# Patient Record
Sex: MALE | Race: WHITE | Employment: FULL TIME | URBAN - METROPOLITAN AREA
[De-identification: names, ages, dates, MRNs, and addresses within clinical notes are randomized per-mention and may not be internally consistent; named-entity substitution may affect disease eponyms.]

---

## 2019-08-28 ENCOUNTER — HOSPITAL ENCOUNTER (EMERGENCY)
Age: 29
Discharge: HOME | End: 2019-08-28
Payer: COMMERCIAL

## 2019-08-28 VITALS
SYSTOLIC BLOOD PRESSURE: 158 MMHG | RESPIRATION RATE: 16 BRPM | HEART RATE: 80 BPM | OXYGEN SATURATION: 99 % | HEIGHT: 68 IN | DIASTOLIC BLOOD PRESSURE: 104 MMHG | TEMPERATURE: 98.3 F | WEIGHT: 200 LBS | BODY MASS INDEX: 30.31 KG/M2

## 2019-08-28 DIAGNOSIS — M25.511 ACUTE PAIN OF RIGHT SHOULDER: Primary | ICD-10-CM

## 2019-08-28 PROCEDURE — 99282 EMERGENCY DEPT VISIT SF MDM: CPT

## 2019-08-28 RX ORDER — CYCLOBENZAPRINE HCL 10 MG
10 TABLET ORAL 3 TIMES DAILY PRN
Qty: 15 TABLET | Refills: 0 | Status: SHIPPED | OUTPATIENT
Start: 2019-08-28 | End: 2019-09-07

## 2019-08-28 RX ORDER — NAPROXEN 500 MG/1
500 TABLET ORAL 2 TIMES DAILY
Qty: 20 TABLET | Refills: 0 | Status: SHIPPED | OUTPATIENT
Start: 2019-08-28 | End: 2019-09-07

## 2019-08-28 SDOH — HEALTH STABILITY: MENTAL HEALTH: HOW OFTEN DO YOU HAVE A DRINK CONTAINING ALCOHOL?: NEVER

## 2019-08-28 ASSESSMENT — PAIN DESCRIPTION - ORIENTATION: ORIENTATION: RIGHT

## 2019-08-28 ASSESSMENT — ENCOUNTER SYMPTOMS
ABDOMINAL PAIN: 0
BACK PAIN: 0
COUGH: 0
SHORTNESS OF BREATH: 0

## 2019-08-28 ASSESSMENT — PAIN DESCRIPTION - LOCATION: LOCATION: SHOULDER

## 2019-08-28 ASSESSMENT — PAIN DESCRIPTION - DESCRIPTORS: DESCRIPTORS: ACHING

## 2019-08-28 ASSESSMENT — PAIN DESCRIPTION - PAIN TYPE: TYPE: ACUTE PAIN

## 2019-08-28 NOTE — ED PROVIDER NOTES
use: None    Sexual activity: None   Lifestyle    Physical activity:     Days per week: None     Minutes per session: None    Stress: None   Relationships    Social connections:     Talks on phone: None     Gets together: None     Attends Catholic service: None     Active member of club or organization: None     Attends meetings of clubs or organizations: None     Relationship status: None    Intimate partner violence:     Fear of current or ex partner: None     Emotionally abused: None     Physically abused: None     Forced sexual activity: None   Other Topics Concern    None   Social History Narrative    None       SCREENINGS      @FLOW([id number])@      PHYSICAL EXAM    (up to 7 for level 4, 8 or more for level 5)     ED Triage Vitals   BP Temp Temp Source Pulse Resp SpO2 Height Weight   08/28/19 1159 08/28/19 1157 08/28/19 1157 08/28/19 1157 08/28/19 1157 08/28/19 1157 08/28/19 1157 08/28/19 1157   (!) 158/104 98.3 °F (36.8 °C) Oral 80 16 99 % 5' 8\" (1.727 m) 200 lb (90.7 kg)       Physical Exam   Constitutional: He is oriented to person, place, and time. He appears well-developed and well-nourished. HENT:   Head: Normocephalic and atraumatic. Right Ear: External ear normal.   Left Ear: External ear normal.   Mouth/Throat: Oropharynx is clear and moist.   Eyes: Pupils are equal, round, and reactive to light. Conjunctivae and EOM are normal.   Neck: Normal range of motion. Neck supple. Cardiovascular: Normal rate and regular rhythm. Pulmonary/Chest: Effort normal and breath sounds normal.   Abdominal: Soft. Bowel sounds are normal. He exhibits no distension. There is no tenderness. Musculoskeletal: Normal range of motion. Right shoulder: He exhibits tenderness and pain. He exhibits normal range of motion, no bony tenderness, no swelling, no effusion, no deformity, no laceration, no spasm and normal strength. Arms:  Neurological: He is alert and oriented to person, place, and time.  He

## 2020-07-05 ENCOUNTER — HOSPITAL ENCOUNTER (EMERGENCY)
Age: 30
Discharge: HOME | End: 2020-07-05
Attending: EMERGENCY MEDICINE

## 2020-07-05 ENCOUNTER — APPOINTMENT (OUTPATIENT)
Dept: CT IMAGING | Age: 30
End: 2020-07-05

## 2020-07-05 VITALS
BODY MASS INDEX: 28 KG/M2 | HEIGHT: 71 IN | HEART RATE: 80 BPM | SYSTOLIC BLOOD PRESSURE: 146 MMHG | WEIGHT: 200 LBS | TEMPERATURE: 98.2 F | DIASTOLIC BLOOD PRESSURE: 100 MMHG | OXYGEN SATURATION: 100 % | RESPIRATION RATE: 20 BRPM

## 2020-07-05 LAB
ALBUMIN SERPL-MCNC: 4.6 G/DL (ref 3.5–4.6)
ALP BLD-CCNC: 97 U/L (ref 35–104)
ALT SERPL-CCNC: 64 U/L (ref 0–41)
ANION GAP SERPL CALCULATED.3IONS-SCNC: 16 MEQ/L (ref 9–15)
AST SERPL-CCNC: 59 U/L (ref 0–40)
BASOPHILS ABSOLUTE: 0.1 K/UL (ref 0–0.2)
BASOPHILS RELATIVE PERCENT: 0.7 %
BILIRUB SERPL-MCNC: <0.2 MG/DL (ref 0.2–0.7)
BUN BLDV-MCNC: 13 MG/DL (ref 6–20)
CALCIUM SERPL-MCNC: 9.2 MG/DL (ref 8.5–9.9)
CHLORIDE BLD-SCNC: 99 MEQ/L (ref 95–107)
CO2: 25 MEQ/L (ref 20–31)
CREAT SERPL-MCNC: 0.91 MG/DL (ref 0.7–1.2)
EOSINOPHILS ABSOLUTE: 0.2 K/UL (ref 0–0.7)
EOSINOPHILS RELATIVE PERCENT: 1.6 %
GFR AFRICAN AMERICAN: >60
GFR AFRICAN AMERICAN: >60
GFR NON-AFRICAN AMERICAN: >60
GFR NON-AFRICAN AMERICAN: >60
GLOBULIN: 2.6 G/DL (ref 2.3–3.5)
GLUCOSE BLD-MCNC: 115 MG/DL (ref 70–99)
HCT VFR BLD CALC: 46.1 % (ref 42–52)
HEMOGLOBIN: 16 G/DL (ref 14–18)
LYMPHOCYTES ABSOLUTE: 1.6 K/UL (ref 1–4.8)
LYMPHOCYTES RELATIVE PERCENT: 14.1 %
MCH RBC QN AUTO: 32.1 PG (ref 27–31.3)
MCHC RBC AUTO-ENTMCNC: 34.7 % (ref 33–37)
MCV RBC AUTO: 92.6 FL (ref 80–100)
MONOCYTES ABSOLUTE: 0.8 K/UL (ref 0.2–0.8)
MONOCYTES RELATIVE PERCENT: 7.2 %
NEUTROPHILS ABSOLUTE: 8.8 K/UL (ref 1.4–6.5)
NEUTROPHILS RELATIVE PERCENT: 76.4 %
PDW BLD-RTO: 12.1 % (ref 11.5–14.5)
PERFORMED ON: NORMAL
PLATELET # BLD: 200 K/UL (ref 130–400)
POC CREATININE: 1.3 MG/DL (ref 0.9–1.3)
POC SAMPLE TYPE: NORMAL
POTASSIUM SERPL-SCNC: 3.8 MEQ/L (ref 3.4–4.9)
RBC # BLD: 4.98 M/UL (ref 4.7–6.1)
SODIUM BLD-SCNC: 140 MEQ/L (ref 135–144)
TOTAL PROTEIN: 7.2 G/DL (ref 6.3–8)
WBC # BLD: 11.5 K/UL (ref 4.8–10.8)

## 2020-07-05 PROCEDURE — 80053 COMPREHEN METABOLIC PANEL: CPT

## 2020-07-05 PROCEDURE — 6360000002 HC RX W HCPCS: Performed by: EMERGENCY MEDICINE

## 2020-07-05 PROCEDURE — 70450 CT HEAD/BRAIN W/O DYE: CPT

## 2020-07-05 PROCEDURE — 74177 CT ABD & PELVIS W/CONTRAST: CPT

## 2020-07-05 PROCEDURE — 99285 EMERGENCY DEPT VISIT HI MDM: CPT

## 2020-07-05 PROCEDURE — 72125 CT NECK SPINE W/O DYE: CPT

## 2020-07-05 PROCEDURE — 36415 COLL VENOUS BLD VENIPUNCTURE: CPT

## 2020-07-05 PROCEDURE — 85025 COMPLETE CBC W/AUTO DIFF WBC: CPT

## 2020-07-05 PROCEDURE — 2580000003 HC RX 258: Performed by: EMERGENCY MEDICINE

## 2020-07-05 PROCEDURE — 96375 TX/PRO/DX INJ NEW DRUG ADDON: CPT

## 2020-07-05 PROCEDURE — 96365 THER/PROPH/DIAG IV INF INIT: CPT

## 2020-07-05 PROCEDURE — 6360000004 HC RX CONTRAST MEDICATION: Performed by: EMERGENCY MEDICINE

## 2020-07-05 PROCEDURE — 71260 CT THORAX DX C+: CPT

## 2020-07-05 PROCEDURE — 96376 TX/PRO/DX INJ SAME DRUG ADON: CPT

## 2020-07-05 RX ORDER — KETOROLAC TROMETHAMINE 30 MG/ML
30 INJECTION, SOLUTION INTRAMUSCULAR; INTRAVENOUS ONCE
Status: COMPLETED | OUTPATIENT
Start: 2020-07-05 | End: 2020-07-05

## 2020-07-05 RX ORDER — ONDANSETRON 2 MG/ML
4 INJECTION INTRAMUSCULAR; INTRAVENOUS ONCE
Status: COMPLETED | OUTPATIENT
Start: 2020-07-05 | End: 2020-07-05

## 2020-07-05 RX ORDER — 0.9 % SODIUM CHLORIDE 0.9 %
1000 INTRAVENOUS SOLUTION INTRAVENOUS ONCE
Status: COMPLETED | OUTPATIENT
Start: 2020-07-05 | End: 2020-07-05

## 2020-07-05 RX ORDER — TRAMADOL HYDROCHLORIDE 50 MG/1
50 TABLET ORAL EVERY 4 HOURS PRN
Qty: 18 TABLET | Refills: 0 | Status: SHIPPED | OUTPATIENT
Start: 2020-07-05 | End: 2020-07-08

## 2020-07-05 RX ORDER — MORPHINE SULFATE 2 MG/ML
4 INJECTION, SOLUTION INTRAMUSCULAR; INTRAVENOUS
Status: COMPLETED | OUTPATIENT
Start: 2020-07-05 | End: 2020-07-05

## 2020-07-05 RX ADMIN — HYDROMORPHONE HYDROCHLORIDE 1 MG: 1 INJECTION, SOLUTION INTRAMUSCULAR; INTRAVENOUS; SUBCUTANEOUS at 05:08

## 2020-07-05 RX ADMIN — KETOROLAC TROMETHAMINE 30 MG: 30 INJECTION, SOLUTION INTRAMUSCULAR at 03:14

## 2020-07-05 RX ADMIN — MORPHINE SULFATE 4 MG: 2 INJECTION, SOLUTION INTRAMUSCULAR; INTRAVENOUS at 03:14

## 2020-07-05 RX ADMIN — IOPAMIDOL 100 ML: 612 INJECTION, SOLUTION INTRAVENOUS at 04:12

## 2020-07-05 RX ADMIN — SODIUM CHLORIDE 1000 ML: 9 INJECTION, SOLUTION INTRAVENOUS at 03:14

## 2020-07-05 RX ADMIN — MORPHINE SULFATE 4 MG: 2 INJECTION, SOLUTION INTRAMUSCULAR; INTRAVENOUS at 03:34

## 2020-07-05 RX ADMIN — ONDANSETRON 4 MG: 2 INJECTION INTRAMUSCULAR; INTRAVENOUS at 05:08

## 2020-07-05 RX ADMIN — ONDANSETRON 4 MG: 2 INJECTION INTRAMUSCULAR; INTRAVENOUS at 03:14

## 2020-07-05 ASSESSMENT — ENCOUNTER SYMPTOMS
COUGH: 0
VOMITING: 0
SORE THROAT: 0
DIARRHEA: 0
BACK PAIN: 0
NAUSEA: 0
ABDOMINAL PAIN: 1
SHORTNESS OF BREATH: 0

## 2020-07-05 ASSESSMENT — PAIN DESCRIPTION - DESCRIPTORS: DESCRIPTORS: ACHING

## 2020-07-05 ASSESSMENT — PAIN SCALES - GENERAL
PAINLEVEL_OUTOF10: 10

## 2020-07-05 ASSESSMENT — PAIN DESCRIPTION - ORIENTATION: ORIENTATION: RIGHT

## 2020-07-05 ASSESSMENT — PAIN DESCRIPTION - LOCATION: LOCATION: RIB CAGE

## 2020-07-05 ASSESSMENT — PAIN DESCRIPTION - PAIN TYPE: TYPE: ACUTE PAIN
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
## 2021-06-06 ENCOUNTER — HOSPITAL ENCOUNTER (EMERGENCY)
Age: 31
Discharge: LEFT BEFORE DISCHARGE | End: 2021-06-06
Payer: COMMERCIAL

## 2021-06-06 ENCOUNTER — HOSPITAL ENCOUNTER (EMERGENCY)
Age: 31
Discharge: HOME | End: 2021-06-06
Attending: EMERGENCY MEDICINE
Payer: COMMERCIAL

## 2021-06-06 VITALS
OXYGEN SATURATION: 99 % | DIASTOLIC BLOOD PRESSURE: 81 MMHG | SYSTOLIC BLOOD PRESSURE: 122 MMHG | WEIGHT: 185 LBS | HEART RATE: 87 BPM | TEMPERATURE: 97.4 F | BODY MASS INDEX: 25.9 KG/M2 | HEIGHT: 71 IN | RESPIRATION RATE: 17 BRPM

## 2021-06-06 VITALS
WEIGHT: 180 LBS | OXYGEN SATURATION: 97 % | SYSTOLIC BLOOD PRESSURE: 129 MMHG | BODY MASS INDEX: 25.2 KG/M2 | DIASTOLIC BLOOD PRESSURE: 83 MMHG | HEART RATE: 92 BPM | TEMPERATURE: 97.8 F | HEIGHT: 71 IN | RESPIRATION RATE: 18 BRPM

## 2021-06-06 DIAGNOSIS — S01.01XA LACERATION OF SCALP, INITIAL ENCOUNTER: Primary | ICD-10-CM

## 2021-06-06 PROCEDURE — 99283 EMERGENCY DEPT VISIT LOW MDM: CPT

## 2021-06-06 PROCEDURE — 6370000000 HC RX 637 (ALT 250 FOR IP): Performed by: EMERGENCY MEDICINE

## 2021-06-06 PROCEDURE — 12001 RPR S/N/AX/GEN/TRNK 2.5CM/<: CPT

## 2021-06-06 RX ORDER — DULOXETIN HYDROCHLORIDE 30 MG/1
30 CAPSULE, DELAYED RELEASE ORAL DAILY
COMMUNITY

## 2021-06-06 RX ORDER — LISINOPRIL 10 MG/1
10 TABLET ORAL DAILY
COMMUNITY

## 2021-06-06 RX ORDER — ACETAMINOPHEN 500 MG
1000 TABLET ORAL ONCE
Status: COMPLETED | OUTPATIENT
Start: 2021-06-06 | End: 2021-06-06

## 2021-06-06 RX ADMIN — ACETAMINOPHEN 1000 MG: 500 TABLET ORAL at 22:39

## 2021-06-06 ASSESSMENT — ENCOUNTER SYMPTOMS
EYE DISCHARGE: 0
VOMITING: 0
NAUSEA: 0
COUGH: 0
EYE REDNESS: 0
SHORTNESS OF BREATH: 0
SORE THROAT: 0
BACK PAIN: 0
ABDOMINAL PAIN: 0

## 2021-06-06 ASSESSMENT — PAIN SCALES - GENERAL
PAINLEVEL_OUTOF10: 5
PAINLEVEL_OUTOF10: 3

## 2021-06-06 ASSESSMENT — PAIN DESCRIPTION - DESCRIPTORS
DESCRIPTORS: ACHING
DESCRIPTORS: ACHING;HEADACHE

## 2021-06-06 ASSESSMENT — PAIN DESCRIPTION - PAIN TYPE: TYPE: ACUTE PAIN

## 2021-06-06 ASSESSMENT — PAIN DESCRIPTION - FREQUENCY: FREQUENCY: CONTINUOUS

## 2021-06-06 ASSESSMENT — PAIN DESCRIPTION - LOCATION
LOCATION: HEAD
LOCATION: HEAD

## 2021-06-06 NOTE — LETTER
[facility] ED  [address] [place] [address]  Phone: [phone]               June 6, 2021    Patient: Pippa Curry   YOB: 1990   Date of Visit: 6/6/2021       To Whom It May Concern:    Duey Nissen was seen and treated in our emergency department on 6/6/2021. He may return to work on 6/7/21.       Sincerely,       Jerri Calvo RN         Signature:__________________________________

## 2021-06-07 NOTE — ED PROVIDER NOTES
Surgical History:   Procedure Laterality Date    ROTATOR CUFF REPAIR           CURRENT MEDICATIONS       Discharge Medication List as of 6/6/2021 10:55 PM      CONTINUE these medications which have NOT CHANGED    Details   DULoxetine (CYMBALTA) 30 MG extended release capsule Take 30 mg by mouth dailyHistorical Med      lisinopril (PRINIVIL;ZESTRIL) 10 MG tablet Take 10 mg by mouth dailyHistorical Med      naproxen (NAPROSYN) 500 MG tablet Take 1 tablet by mouth 2 times daily for 20 doses, Disp-20 tablet, R-0Print             ALLERGIES     Patient has no known allergies. FAMILY HISTORY     History reviewed. No pertinent family history. SOCIAL HISTORY       Social History     Socioeconomic History    Marital status:      Spouse name: None    Number of children: None    Years of education: None    Highest education level: None   Occupational History    None   Tobacco Use    Smoking status: Current Some Day Smoker    Smokeless tobacco: Never Used   Vaping Use    Vaping Use: Never used   Substance and Sexual Activity    Alcohol use: Yes    Drug use: Never    Sexual activity: None   Other Topics Concern    None   Social History Narrative    None     Social Determinants of Health     Financial Resource Strain:     Difficulty of Paying Living Expenses:    Food Insecurity:     Worried About Running Out of Food in the Last Year:     [address] in the Last Year:    Transportation Needs:     Lack of Transportation (Medical):      Lack of Transportation (Non-Medical):    Physical Activity:     Days of Exercise per Week:     Minutes of Exercise per Session:    Stress:     Feeling of Stress :    Social Connections:     Frequency of Communication with Friends and Family:     Frequency of Social Gatherings with Friends and Family:     Attends Yarsanism Services:     Active Member of Clubs or Organizations:     Attends Club or Organization Meetings:     Marital Status:    Intimate Partner Violence:     Fear of Current or Ex-Partner:     Emotionally Abused:     Physically Abused:     Sexually Abused:          PHYSICAL EXAM       ED Triage Vitals [06/06/21 2216]   BP Temp Temp Source Pulse Resp SpO2 Height Weight   122/81 97.4 °F (36.3 °C) Oral 87 17 99 % 5' 11\" (1.803 m) 185 lb (83.9 kg)       Physical Exam  Vitals and nursing note reviewed. Constitutional:       Appearance: Normal appearance. HENT:      Head: Normocephalic and atraumatic. Right Ear: Tympanic membrane normal.      Left Ear: Tympanic membrane normal.      Nose: Nose normal.      Mouth/Throat:      Mouth: Mucous membranes are moist.      Pharynx: Oropharynx is clear. Eyes:      General: Lids are normal.      Extraocular Movements: Extraocular movements intact. Conjunctiva/sclera: Conjunctivae normal.      Pupils: Pupils are equal, round, and reactive to light. Cardiovascular:      Rate and Rhythm: Normal rate and regular rhythm. Pulses: Normal pulses. Heart sounds: Normal heart sounds. Pulmonary:      Effort: Pulmonary effort is normal.      Breath sounds: Normal breath sounds. Abdominal:      General: Abdomen is flat. Bowel sounds are normal.      Palpations: Abdomen is soft. Musculoskeletal:         General: Normal range of motion. Cervical back: Full passive range of motion without pain, normal range of motion and neck supple. Skin:     General: Skin is warm. Capillary Refill: Capillary refill takes less than 2 seconds. Findings: Laceration and lesion present. Neurological:      General: No focal deficit present. Mental Status: He is alert and oriented to person, place, and time. Deep Tendon Reflexes: Reflexes are normal and symmetric. Psychiatric:         Attention and Perception: Attention and perception normal.         Mood and Affect: Mood normal.         Behavior: Behavior normal. Behavior is cooperative.            LABS:  Labs Reviewed - No data to display      MDM:   Vitals:    Vitals:    06/06/21 2216   BP: 122/81   Pulse: 87   Resp: 17   Temp: 97.4 °F (36.3 °C)   TempSrc: Oral   SpO2: 99%   Weight: 185 lb (83.9 kg)   Height: 5' 11\" (1.803 m)       MDM  Number of Diagnoses or Management Options  Diagnosis management comments: Patient presents with head laceration. 5 staples placed. The patient will follow up in 9 days for staple removal.  He will take OTC pain medication as needed. Lakisha Rene DO     The lab results, radiology and test results were reviewed with the patient and family. The patient will follow up in 2 days with their primary care doctor. If their symptoms change or get worse they will return to the ER. CRITICAL CARE TIME   Total CriticalCare time was 0 minutes, excluding separately reportable procedures. There was a high probability of clinically significant/life threatening deterioration in the patient's condition which required my urgent intervention. PROCEDURES:  Unlessotherwise noted below, none     Lac Repair    Date/Time: 6/6/2021 10:29 PM  Performed by: Sampson Aleman DO  Authorized by: Sampson Aleman DO     Consent:     Consent obtained:  Verbal    Consent given by:  Patient    Risks discussed:  Infection, pain, poor cosmetic result and poor wound healing    Alternatives discussed:  No treatment, delayed treatment and observation  Anesthesia (see MAR for exact dosages):      Anesthesia method:  None  Laceration details:     Location:  Scalp    Scalp location:  Occipital  Repair type:     Repair type:  Simple  Pre-procedure details:     Preparation:  Patient was prepped and draped in usual sterile fashion  Exploration:     Hemostasis achieved with:  Direct pressure    Wound exploration: wound explored through full range of motion      Contaminated: no    Treatment:     Area cleansed with:  Saline and Shur-Clens    Amount of cleaning:  Standard    Irrigation method:  Pressure wash    Visualized foreign bodies/material removed: no    Skin repair:     Repair method:  Staples  Approximation:     Approximation:  Close  Post-procedure details:     Patient tolerance of procedure: Tolerated well, no immediate complications          FINAL IMPRESSION      1.  Laceration of scalp, initial encounter          DISPOSITION/PLAN   DISPOSITION            Lakisha Rene DO (electronically signed)  Attending Emergency Physician          Sampson Aleman DO  06/09/21 2052

## 2023-03-11 ENCOUNTER — APPOINTMENT (OUTPATIENT)
Dept: CT IMAGING | Age: 33
End: 2023-03-11
Payer: COMMERCIAL

## 2023-03-11 ENCOUNTER — HOSPITAL ENCOUNTER (EMERGENCY)
Age: 33
Discharge: PSYCHIATRIC OR SUBSTANCE USE FACILITY | End: 2023-03-12
Attending: EMERGENCY MEDICINE
Payer: COMMERCIAL

## 2023-03-11 ENCOUNTER — APPOINTMENT (OUTPATIENT)
Dept: GENERAL RADIOLOGY | Age: 33
End: 2023-03-11
Payer: COMMERCIAL

## 2023-03-11 DIAGNOSIS — F10.920 ACUTE ALCOHOLIC INTOXICATION WITHOUT COMPLICATION (HCC): Primary | ICD-10-CM

## 2023-03-11 DIAGNOSIS — F39 MOOD DISORDER (HCC): ICD-10-CM

## 2023-03-11 LAB
ACETAMINOPHEN LEVEL: <5 UG/ML (ref 10–30)
ALBUMIN SERPL-MCNC: 4.9 G/DL (ref 3.5–4.6)
ALP BLD-CCNC: 93 U/L (ref 35–104)
ALT SERPL-CCNC: 34 U/L (ref 0–41)
AMPHETAMINE SCREEN, URINE: NORMAL
ANION GAP SERPL CALCULATED.3IONS-SCNC: 15 MEQ/L (ref 9–15)
APTT: 25.3 SEC (ref 24.4–36.8)
AST SERPL-CCNC: 33 U/L (ref 0–40)
BARBITURATE SCREEN URINE: NORMAL
BASOPHILS ABSOLUTE: 0.1 K/UL (ref 0–0.2)
BASOPHILS RELATIVE PERCENT: 0.6 %
BENZODIAZEPINE SCREEN, URINE: NORMAL
BILIRUB SERPL-MCNC: <0.2 MG/DL (ref 0.2–0.7)
BILIRUBIN URINE: NEGATIVE
BLOOD, URINE: NEGATIVE
BUN BLDV-MCNC: 17 MG/DL (ref 6–20)
CALCIUM SERPL-MCNC: 9.3 MG/DL (ref 8.5–9.9)
CANNABINOID SCREEN URINE: NORMAL
CHLORIDE BLD-SCNC: 103 MEQ/L (ref 95–107)
CHOLESTEROL, TOTAL: 183 MG/DL (ref 0–199)
CLARITY: CLEAR
CO2: 21 MEQ/L (ref 20–31)
COCAINE METABOLITE SCREEN URINE: NORMAL
COLOR: YELLOW
CREAT SERPL-MCNC: 0.98 MG/DL (ref 0.7–1.2)
EOSINOPHILS ABSOLUTE: 0.3 K/UL (ref 0–0.7)
EOSINOPHILS RELATIVE PERCENT: 2.7 %
ETHANOL PERCENT: 0.19 G/DL
ETHANOL: 212 MG/DL (ref 0–0.08)
FENTANYL SCREEN, URINE: NORMAL
GFR SERPL CREATININE-BSD FRML MDRD: >60 ML/MIN/{1.73_M2}
GLOBULIN: 2.5 G/DL (ref 2.3–3.5)
GLUCOSE BLD-MCNC: 122 MG/DL (ref 70–99)
GLUCOSE URINE: NEGATIVE MG/DL
HCT VFR BLD CALC: 47.5 % (ref 42–52)
HDLC SERPL-MCNC: 48 MG/DL (ref 40–59)
HEMOGLOBIN: 16.3 G/DL (ref 14–18)
INR BLD: 0.9
KETONES, URINE: NEGATIVE MG/DL
LDL CHOLESTEROL CALCULATED: ABNORMAL MG/DL (ref 0–129)
LEUKOCYTE ESTERASE, URINE: NEGATIVE
LYMPHOCYTES ABSOLUTE: 2.1 K/UL (ref 1–4.8)
LYMPHOCYTES RELATIVE PERCENT: 20.4 %
Lab: NORMAL
MCH RBC QN AUTO: 31.6 PG (ref 27–31.3)
MCHC RBC AUTO-ENTMCNC: 34.3 % (ref 33–37)
MCV RBC AUTO: 92.1 FL (ref 79–92.2)
METHADONE SCREEN, URINE: NORMAL
MONOCYTES ABSOLUTE: 0.5 K/UL (ref 0.2–0.8)
MONOCYTES RELATIVE PERCENT: 5.2 %
NEUTROPHILS ABSOLUTE: 7.3 K/UL (ref 1.4–6.5)
NEUTROPHILS RELATIVE PERCENT: 71.1 %
NITRITE, URINE: NEGATIVE
OPIATE SCREEN URINE: NORMAL
OXYCODONE URINE: NORMAL
PDW BLD-RTO: 12.1 % (ref 11.5–14.5)
PH UA: 5.5 (ref 5–9)
PHENCYCLIDINE SCREEN URINE: NORMAL
PLATELET # BLD: 300 K/UL (ref 130–400)
POTASSIUM SERPL-SCNC: 4 MEQ/L (ref 3.4–4.9)
PROPOXYPHENE SCREEN: NORMAL
PROTEIN UA: NEGATIVE MG/DL
PROTHROMBIN TIME: 12.6 SEC (ref 12.3–14.9)
RBC # BLD: 5.15 M/UL (ref 4.7–6.1)
SALICYLATE, SERUM: <0.3 MG/DL (ref 15–30)
SARS-COV-2, NAAT: NOT DETECTED
SODIUM BLD-SCNC: 139 MEQ/L (ref 135–144)
SPECIFIC GRAVITY UA: 1 (ref 1–1.03)
TOTAL CK: 136 U/L (ref 0–190)
TOTAL PROTEIN: 7.4 G/DL (ref 6.3–8)
TRIGL SERPL-MCNC: 526 MG/DL (ref 0–150)
TSH REFLEX: 1.28 UIU/ML (ref 0.44–3.86)
URINE REFLEX TO CULTURE: NORMAL
UROBILINOGEN, URINE: 0.2 E.U./DL
WBC # BLD: 10.2 K/UL (ref 4.8–10.8)

## 2023-03-11 PROCEDURE — 87635 SARS-COV-2 COVID-19 AMP PRB: CPT

## 2023-03-11 PROCEDURE — 80179 DRUG ASSAY SALICYLATE: CPT

## 2023-03-11 PROCEDURE — 82077 ASSAY SPEC XCP UR&BREATH IA: CPT

## 2023-03-11 PROCEDURE — 70450 CT HEAD/BRAIN W/O DYE: CPT

## 2023-03-11 PROCEDURE — 81003 URINALYSIS AUTO W/O SCOPE: CPT

## 2023-03-11 PROCEDURE — 99285 EMERGENCY DEPT VISIT HI MDM: CPT

## 2023-03-11 PROCEDURE — 71046 X-RAY EXAM CHEST 2 VIEWS: CPT

## 2023-03-11 PROCEDURE — 82550 ASSAY OF CK (CPK): CPT

## 2023-03-11 PROCEDURE — 85610 PROTHROMBIN TIME: CPT

## 2023-03-11 PROCEDURE — 80143 DRUG ASSAY ACETAMINOPHEN: CPT

## 2023-03-11 PROCEDURE — 72125 CT NECK SPINE W/O DYE: CPT

## 2023-03-11 PROCEDURE — 84443 ASSAY THYROID STIM HORMONE: CPT

## 2023-03-11 PROCEDURE — 80053 COMPREHEN METABOLIC PANEL: CPT

## 2023-03-11 PROCEDURE — 73080 X-RAY EXAM OF ELBOW: CPT

## 2023-03-11 PROCEDURE — 80061 LIPID PANEL: CPT

## 2023-03-11 PROCEDURE — 36415 COLL VENOUS BLD VENIPUNCTURE: CPT

## 2023-03-11 PROCEDURE — 85730 THROMBOPLASTIN TIME PARTIAL: CPT

## 2023-03-11 PROCEDURE — 80307 DRUG TEST PRSMV CHEM ANLYZR: CPT

## 2023-03-11 PROCEDURE — 85025 COMPLETE CBC W/AUTO DIFF WBC: CPT

## 2023-03-11 ASSESSMENT — PAIN - FUNCTIONAL ASSESSMENT: PAIN_FUNCTIONAL_ASSESSMENT: NONE - DENIES PAIN

## 2023-03-11 ASSESSMENT — ENCOUNTER SYMPTOMS
PHOTOPHOBIA: 0
ABDOMINAL PAIN: 0

## 2023-03-12 VITALS
DIASTOLIC BLOOD PRESSURE: 88 MMHG | HEIGHT: 71 IN | TEMPERATURE: 98.1 F | BODY MASS INDEX: 28 KG/M2 | HEART RATE: 83 BPM | RESPIRATION RATE: 18 BRPM | OXYGEN SATURATION: 97 % | SYSTOLIC BLOOD PRESSURE: 132 MMHG | WEIGHT: 200 LBS

## 2023-03-12 LAB
ETHANOL PERCENT: 0.09 G/DL
ETHANOL: 102 MG/DL (ref 0–0.08)

## 2023-03-12 PROCEDURE — 36415 COLL VENOUS BLD VENIPUNCTURE: CPT

## 2023-03-12 PROCEDURE — 82077 ASSAY SPEC XCP UR&BREATH IA: CPT

## 2023-03-12 RX ORDER — LAMOTRIGINE 150 MG/1
150 TABLET ORAL DAILY
COMMUNITY
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
## 2023-03-12 NOTE — ED NOTES
Patient accepting to [facility] under Dr Ananias Simmonds. n2n [phone]    Dr Jorge Lebron.      Irineo Portillo RN  03/12/23 8581
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
## 2023-03-12 NOTE — ED NOTES
Patient restored, respirations unlabored. Safety precautions maintained.       Alexander Haynes RN  03/12/23 0003

## 2023-03-12 NOTE — ED NOTES
Patient resting quietly. Respirations are even and unlabored. No distress noted at this time.   Lab notified for ETOH redraw     Radha Wells RN  03/12/23 7965

## 2023-03-12 NOTE — ED NOTES
Unable to reach [facility] at this time. Packet faxed to [facility] and [facility] for review.      Americo Whiting RN  03/12/23 6806

## 2023-03-12 NOTE — ED NOTES
Father here to visit patient. Patient gave permission to do so. Bibi Stout  [facility]  03/12/23 1026

## 2023-03-12 NOTE — ED NOTES
Patient resting quietly. Respirations are even and unlabored. No distress noted at this time.      Jana Dumont RN  03/12/23 5437
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
## 2023-03-12 NOTE — ED NOTES
Person called reporting to be patient mother. No HIPAA code, informed her couldn't release information without it. She requested to speak to patient. Patient given his HIPAA code, explained what's its for, declined to speak to his mother.      Asad Spear RN  03/12/23 0384

## 2023-03-12 NOTE — ED NOTES
Faxed information to [facility][note 3] as request by Neto Hernandez and with patient verbal consent. Stas Sher [facility]  03/12/23 2547
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
## 2023-03-12 NOTE — ED NOTES
[facility] brought pt w/ pink slip for suicidal ideations w/ a plan. Pt a+o x's 4, abc's intact, skin warm and dry. Pt denies he's suicidal or homicidal at this time. Pt calm and cooperative.     Juan Jose Freeman  03/11/23 2000

## 2023-07-22 ENCOUNTER — APPOINTMENT (OUTPATIENT)
Dept: GENERAL RADIOLOGY | Age: 33
End: 2023-07-22
Payer: OTHER MISCELLANEOUS

## 2023-07-22 ENCOUNTER — HOSPITAL ENCOUNTER (EMERGENCY)
Age: 33
Discharge: HOME | End: 2023-07-22
Attending: FAMILY MEDICINE
Payer: OTHER MISCELLANEOUS

## 2023-07-22 VITALS
OXYGEN SATURATION: 98 % | HEART RATE: 88 BPM | DIASTOLIC BLOOD PRESSURE: 95 MMHG | RESPIRATION RATE: 18 BRPM | TEMPERATURE: 97.7 F | WEIGHT: 200 LBS | SYSTOLIC BLOOD PRESSURE: 127 MMHG | HEIGHT: 71 IN | BODY MASS INDEX: 28 KG/M2

## 2023-07-22 DIAGNOSIS — S93.401A SPRAIN OF RIGHT ANKLE, UNSPECIFIED LIGAMENT, INITIAL ENCOUNTER: Primary | ICD-10-CM

## 2023-07-22 DIAGNOSIS — S92.154A CLOSED NONDISPLACED AVULSION FRACTURE OF RIGHT TALUS, INITIAL ENCOUNTER: ICD-10-CM

## 2023-07-22 PROCEDURE — 73610 X-RAY EXAM OF ANKLE: CPT

## 2023-07-22 PROCEDURE — 99283 EMERGENCY DEPT VISIT LOW MDM: CPT

## 2023-07-22 PROCEDURE — 73630 X-RAY EXAM OF FOOT: CPT

## 2023-07-22 ASSESSMENT — LIFESTYLE VARIABLES
HOW MANY STANDARD DRINKS CONTAINING ALCOHOL DO YOU HAVE ON A TYPICAL DAY: PATIENT DOES NOT DRINK
HOW OFTEN DO YOU HAVE A DRINK CONTAINING ALCOHOL: NEVER

## 2023-07-22 NOTE — ED NOTES
Patient back from Xray. Ice pack applied to right ankle.      Shanda Dubin Call, SWATI  07/22/23 1640       Shanda Dubin Call, SWATI  07/22/23 2981

## 2023-07-23 ASSESSMENT — ENCOUNTER SYMPTOMS: RESPIRATORY NEGATIVE: 1
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
